# Patient Record
Sex: MALE | Race: WHITE | NOT HISPANIC OR LATINO | URBAN - METROPOLITAN AREA
[De-identification: names, ages, dates, MRNs, and addresses within clinical notes are randomized per-mention and may not be internally consistent; named-entity substitution may affect disease eponyms.]

---

## 2018-01-15 PROBLEM — 12479006 COMPULSIVE BEHAVIOR: Status: ACTIVE | Noted: 2018-01-15

## 2018-01-15 PROBLEM — 134407002 CHRONIC BACK PAIN: Status: ACTIVE | Noted: 2018-01-15

## 2018-01-15 PROBLEM — 271737000 ANEMIA: Status: ACTIVE | Noted: 2018-01-15

## 2018-01-15 PROBLEM — 197480006 ANXIETY DISORDER: Status: ACTIVE | Noted: 2018-01-15

## 2018-01-15 PROBLEM — 38341003 HYPERTENSION: Status: ACTIVE | Noted: 2018-01-15

## 2018-01-15 PROBLEM — 235595009 GASTROESOPHAGEAL REFLUX DISEASE: Status: ACTIVE | Noted: 2018-01-15

## 2020-01-22 PROBLEM — 398050005 DIVERTICULAR DISEASE OF COLON: Status: ACTIVE | Noted: 2020-01-22

## 2021-05-27 ENCOUNTER — OFFICE VISIT (OUTPATIENT)
Dept: URBAN - METROPOLITAN AREA CLINIC 46 | Facility: CLINIC | Age: 41
End: 2021-05-27

## 2021-08-26 ENCOUNTER — OFFICE VISIT (OUTPATIENT)
Dept: URBAN - METROPOLITAN AREA CLINIC 44 | Facility: CLINIC | Age: 41
End: 2021-08-26

## 2021-08-28 ENCOUNTER — TELEPHONE ENCOUNTER (OUTPATIENT)
Dept: URBAN - METROPOLITAN AREA CLINIC 13 | Facility: CLINIC | Age: 41
End: 2021-08-28

## 2021-08-28 RX ORDER — DEXLANSOPRAZOLE 30 MG/1
CAPSULE, DELAYED RELEASE ORAL
OUTPATIENT
Start: 2018-01-16 | End: 2021-08-18

## 2021-08-28 RX ORDER — HYDROCORTISONE 25 MG/G
CREAM TOPICAL
OUTPATIENT
Start: 2018-02-06 | End: 2020-01-22

## 2021-08-28 RX ORDER — RIFAXIMIN 550 MG/1
TABLET ORAL
OUTPATIENT
Start: 2018-02-09 | End: 2020-01-22

## 2021-08-29 ENCOUNTER — TELEPHONE ENCOUNTER (OUTPATIENT)
Dept: URBAN - METROPOLITAN AREA CLINIC 13 | Facility: CLINIC | Age: 41
End: 2021-08-29

## 2021-08-29 RX ORDER — COLESTIPOL HYDROCHLORIDE 1 G/1
TABLET ORAL
Status: ACTIVE | COMMUNITY
Start: 2020-03-12

## 2021-08-29 RX ORDER — DEXLANSOPRAZOLE 60 MG/1
CAPSULE, DELAYED RELEASE ORAL
Status: ACTIVE | COMMUNITY
Start: 2021-05-27

## 2021-08-29 RX ORDER — CLONAZEPAM 0.5 MG/1
TABLET ORAL
Status: ACTIVE | COMMUNITY

## 2021-08-29 RX ORDER — FEXOFENADINE HYDROCHLORIDE 180 MG/1
TABLET, FILM COATED ORAL
Status: ACTIVE | COMMUNITY

## 2021-08-29 RX ORDER — RISPERIDONE 3 MG/1
TABLET ORAL
Status: ACTIVE | COMMUNITY

## 2021-08-29 RX ORDER — FERROUS SULFATE 324(65)MG
TABLET, DELAYED RELEASE (ENTERIC COATED) ORAL
Status: ACTIVE | COMMUNITY

## 2021-08-29 RX ORDER — SERTRALINE HCL 100 MG
TABLET ORAL
Status: ACTIVE | COMMUNITY

## 2021-08-29 RX ORDER — LISINOPRIL 10 MG/1
TABLET ORAL
Status: ACTIVE | COMMUNITY

## 2021-08-29 RX ORDER — OXCARBAZEPINE 150 MG
TABLET ORAL
Status: ACTIVE | COMMUNITY

## 2021-08-29 RX ORDER — PROMETHAZINE HYDROCHLORIDE 25 MG/1
TABLET ORAL
Status: ACTIVE | COMMUNITY

## 2021-09-30 ENCOUNTER — CLAIMS CREATED FROM THE CLAIM WINDOW (OUTPATIENT)
Dept: URBAN - METROPOLITAN AREA CLINIC 4 | Facility: CLINIC | Age: 41
End: 2021-09-30
Payer: MEDICARE

## 2021-09-30 ENCOUNTER — OFFICE VISIT (OUTPATIENT)
Dept: URBAN - METROPOLITAN AREA SURGERY CENTER 27 | Facility: SURGERY CENTER | Age: 41
End: 2021-09-30
Payer: MEDICARE

## 2021-09-30 DIAGNOSIS — K31.89 NONSURGICAL DUMPING SYNDROME: ICD-10-CM

## 2021-09-30 DIAGNOSIS — R13.10 ABNORMAL DEGLUTITION: ICD-10-CM

## 2021-09-30 DIAGNOSIS — K22.2 ACQUIRED ESOPHAGEAL RING: ICD-10-CM

## 2021-09-30 DIAGNOSIS — K21.9 ACID REFLUX: ICD-10-CM

## 2021-09-30 DIAGNOSIS — K29.70 CHRONIC ACITVE GASTRITIS (H.PYLORI NEGATIVE): ICD-10-CM

## 2021-09-30 DIAGNOSIS — R12 BURNING REFLUX: ICD-10-CM

## 2021-09-30 DIAGNOSIS — K31.89 ACQUIRED DEFORMITY OF DUODENUM: ICD-10-CM

## 2021-09-30 DIAGNOSIS — K21.9 GASTRO-ESOPHAGEAL REFLUX DISEASE WITHOUT ESOPHAGITIS: ICD-10-CM

## 2021-09-30 PROCEDURE — 88305 TISSUE EXAM BY PATHOLOGIST: CPT | Performed by: PATHOLOGY

## 2021-09-30 PROCEDURE — G8907 PT DOC NO EVENTS ON DISCHARG: HCPCS | Performed by: INTERNAL MEDICINE

## 2021-09-30 PROCEDURE — 88312 SPECIAL STAINS GROUP 1: CPT | Performed by: PATHOLOGY

## 2021-09-30 PROCEDURE — 43239 EGD BIOPSY SINGLE/MULTIPLE: CPT | Performed by: INTERNAL MEDICINE

## 2021-09-30 PROCEDURE — 43249 ESOPH EGD DILATION <30 MM: CPT | Performed by: INTERNAL MEDICINE

## 2021-09-30 RX ORDER — OXCARBAZEPINE 150 MG
TABLET ORAL
Status: ACTIVE | COMMUNITY

## 2021-09-30 RX ORDER — SERTRALINE HCL 100 MG
TABLET ORAL
Status: ACTIVE | COMMUNITY

## 2021-09-30 RX ORDER — CLONAZEPAM 0.5 MG/1
TABLET ORAL
Status: ACTIVE | COMMUNITY

## 2021-09-30 RX ORDER — PROMETHAZINE HYDROCHLORIDE 25 MG/1
TABLET ORAL
Status: ACTIVE | COMMUNITY

## 2021-09-30 RX ORDER — FERROUS SULFATE 324(65)MG
TABLET, DELAYED RELEASE (ENTERIC COATED) ORAL
Status: ACTIVE | COMMUNITY

## 2021-09-30 RX ORDER — DEXLANSOPRAZOLE 60 MG/1
CAPSULE, DELAYED RELEASE ORAL
Status: ACTIVE | COMMUNITY
Start: 2021-05-27

## 2021-09-30 RX ORDER — RISPERIDONE 3 MG/1
TABLET ORAL
Status: ACTIVE | COMMUNITY

## 2021-09-30 RX ORDER — FEXOFENADINE HYDROCHLORIDE 180 MG/1
TABLET, FILM COATED ORAL
Status: ACTIVE | COMMUNITY

## 2021-09-30 RX ORDER — LISINOPRIL 10 MG/1
TABLET ORAL
Status: ACTIVE | COMMUNITY

## 2021-09-30 RX ORDER — COLESTIPOL HYDROCHLORIDE 1 G/1
TABLET ORAL
Status: ACTIVE | COMMUNITY
Start: 2020-03-12

## 2021-12-17 ENCOUNTER — TELEPHONE ENCOUNTER (OUTPATIENT)
Dept: URBAN - METROPOLITAN AREA CLINIC 46 | Facility: CLINIC | Age: 41
End: 2021-12-17

## 2021-12-17 RX ORDER — COLESTIPOL HYDROCHLORIDE 1 G/1
1 TABLET TABLET ORAL
Qty: 60 | Refills: 3
Start: 2020-03-12

## 2022-03-23 ENCOUNTER — TELEPHONE ENCOUNTER (OUTPATIENT)
Dept: URBAN - METROPOLITAN AREA CLINIC 46 | Facility: CLINIC | Age: 42
End: 2022-03-23

## 2022-03-23 RX ORDER — COLESTIPOL HYDROCHLORIDE 1 G/1
1 TABLET TABLET ORAL
Qty: 60 | Refills: 3
Start: 2020-03-12

## 2022-03-30 ENCOUNTER — TELEPHONE ENCOUNTER (OUTPATIENT)
Dept: URBAN - METROPOLITAN AREA CLINIC 46 | Facility: CLINIC | Age: 42
End: 2022-03-30

## 2022-07-18 ENCOUNTER — TELEPHONE ENCOUNTER (OUTPATIENT)
Dept: URBAN - METROPOLITAN AREA CLINIC 44 | Facility: CLINIC | Age: 42
End: 2022-07-18

## 2022-07-18 RX ORDER — COLESTIPOL HYDROCHLORIDE 1 G/1
1 TABLET TABLET ORAL
Qty: 60 | Refills: 3
Start: 2020-03-12

## 2022-07-28 ENCOUNTER — WEB ENCOUNTER (OUTPATIENT)
Dept: URBAN - METROPOLITAN AREA CLINIC 44 | Facility: CLINIC | Age: 42
End: 2022-07-28

## 2022-07-28 RX ORDER — DEXLANSOPRAZOLE 60 MG/1
1 CAPSULE CAPSULE, DELAYED RELEASE ORAL ONCE A DAY
Qty: 30 CAPSULE | Refills: 3
Start: 2021-05-27

## 2022-07-28 RX ORDER — DEXLANSOPRAZOLE 60 MG/1
CAPSULE, DELAYED RELEASE ORAL
Start: 2021-05-27

## 2022-11-14 ENCOUNTER — TELEPHONE ENCOUNTER (OUTPATIENT)
Dept: URBAN - METROPOLITAN AREA CLINIC 46 | Facility: CLINIC | Age: 42
End: 2022-11-14

## 2022-11-14 RX ORDER — COLESTIPOL HYDROCHLORIDE 1 G/1
1 TABLET TABLET ORAL
Qty: 60 | Refills: 4

## 2022-12-05 ENCOUNTER — TELEPHONE ENCOUNTER (OUTPATIENT)
Dept: URBAN - METROPOLITAN AREA CLINIC 44 | Facility: CLINIC | Age: 42
End: 2022-12-05

## 2022-12-05 RX ORDER — DEXLANSOPRAZOLE 60 MG/1
1 CAPSULE CAPSULE, DELAYED RELEASE ORAL ONCE A DAY
Qty: 30 CAPSULE | Refills: 3
Start: 2021-05-27

## 2023-01-27 ENCOUNTER — ERX REFILL RESPONSE (OUTPATIENT)
Dept: URBAN - METROPOLITAN AREA CLINIC 44 | Facility: CLINIC | Age: 43
End: 2023-01-27

## 2023-01-27 RX ORDER — DEXLANSOPRAZOLE 60 MG/1
1 CAPSULE CAPSULE, DELAYED RELEASE ORAL ONCE A DAY
Qty: 30 CAPSULE | Refills: 3 | OUTPATIENT

## 2023-01-27 RX ORDER — DEXLANSOPRAZOLE 60 MG/1
TAKE ONE CAPSULE BY MOUTH ONE TIME DAILY CAPSULE, DELAYED RELEASE ORAL
Qty: 30 CAPSULE | Refills: 3 | OUTPATIENT

## 2023-03-01 ENCOUNTER — OFFICE VISIT (OUTPATIENT)
Dept: URBAN - METROPOLITAN AREA CLINIC 44 | Facility: CLINIC | Age: 43
End: 2023-03-01
Payer: MEDICARE

## 2023-03-01 VITALS
WEIGHT: 241 LBS | HEIGHT: 66 IN | BODY MASS INDEX: 38.73 KG/M2 | SYSTOLIC BLOOD PRESSURE: 128 MMHG | DIASTOLIC BLOOD PRESSURE: 82 MMHG | OXYGEN SATURATION: 98 % | HEART RATE: 90 BPM | TEMPERATURE: 97.9 F

## 2023-03-01 DIAGNOSIS — R10.11 ABDOMINAL BURNING SENSATION IN RIGHT UPPER QUADRANT: ICD-10-CM

## 2023-03-01 DIAGNOSIS — R19.8 BORBORYGMUS: ICD-10-CM

## 2023-03-01 DIAGNOSIS — R10.12 ABDOMINAL BURNING SENSATION IN LEFT UPPER QUADRANT: ICD-10-CM

## 2023-03-01 PROBLEM — 235595009: Status: ACTIVE | Noted: 2023-03-01

## 2023-03-01 PROCEDURE — 99214 OFFICE O/P EST MOD 30 MIN: CPT | Performed by: PHYSICIAN ASSISTANT

## 2023-03-01 RX ORDER — DEXLANSOPRAZOLE 60 MG/1
TAKE ONE CAPSULE BY MOUTH ONE TIME DAILY CAPSULE, DELAYED RELEASE ORAL ONCE A DAY
OUTPATIENT

## 2023-03-01 RX ORDER — L.ACID,FERM,PLA,RHA/B.BIF,LONG 126 MG
AS DIRECTED TABLET, DELAYED AND EXTENDED RELEASE ORAL DAILY
Status: ACTIVE | COMMUNITY

## 2023-03-01 RX ORDER — SUCRALFATE 1 G/10ML
10 ML ON AN EMPTY STOMACH SUSPENSION ORAL TWICE A DAY
Qty: 600 ML | Refills: 1 | OUTPATIENT
Start: 2023-03-01 | End: 2023-04-30

## 2023-03-01 RX ORDER — LISINOPRIL 10 MG/1
1 TABLET TABLET ORAL ONCE A DAY
Status: ACTIVE | COMMUNITY

## 2023-03-01 RX ORDER — SERTRALINE HCL 100 MG
1 TABLET TABLET ORAL ONCE A DAY
Status: ACTIVE | COMMUNITY

## 2023-03-01 RX ORDER — RISPERIDONE 3 MG/1
1 TABLET TABLET ORAL ONCE A DAY
Status: ACTIVE | COMMUNITY

## 2023-03-01 RX ORDER — FEXOFENADINE HYDROCHLORIDE 180 MG/1
1 TABLET TABLET, FILM COATED ORAL ONCE A DAY
Status: ACTIVE | COMMUNITY

## 2023-03-01 RX ORDER — OXCARBAZEPINE 150 MG
1 TABLET TABLET ORAL TWICE A DAY
Status: ACTIVE | COMMUNITY

## 2023-03-01 RX ORDER — MIRABEGRON 25 MG/1
1 TABLET TABLET, FILM COATED, EXTENDED RELEASE ORAL ONCE A DAY
Status: ACTIVE | COMMUNITY

## 2023-03-01 RX ORDER — DEXLANSOPRAZOLE 60 MG/1
TAKE ONE CAPSULE BY MOUTH ONE TIME DAILY CAPSULE, DELAYED RELEASE ORAL ONCE A DAY
Refills: 3 | Status: ACTIVE | COMMUNITY

## 2023-03-01 RX ORDER — BUSPIRONE HYDROCHLORIDE 10 MG/1
1 TABLET TABLET ORAL TWICE A DAY
Status: ACTIVE | COMMUNITY

## 2023-03-01 RX ORDER — FERROUS SULFATE 325(65) MG
1 TABLET TABLET ORAL ONCE A DAY
Status: ACTIVE | COMMUNITY

## 2023-03-01 RX ORDER — COLESTIPOL HYDROCHLORIDE 1 G/1
1 TABLET TABLET ORAL
Qty: 60 | Refills: 4 | Status: ACTIVE | COMMUNITY

## 2023-03-01 NOTE — PHYSICAL EXAM NEUROLOGIC:
oriented to person, place and time , normal sensation , short and long term memory intact. 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

## 2023-03-01 NOTE — PHYSICAL EXAM GASTROINTESTINAL
Abdomen , soft, nontender, obese , no guarding or rigidity , no masses palpable , normal bowel sounds , Liver and Spleen , no hepatomegaly present , no hepatosplenomegaly , liver nontender , spleen not palpable

## 2023-03-01 NOTE — HPI-TODAY'S VISIT:
43 y/o male with h/o reflux and diarrhea suspected to be post-cholecystectomy related. When last seen in 8/2021, he had c/o break through reflux and dyspepsia symptoms as well as occasional dysphagia; at that time was on Dexilant 60 mg daily. An EGD was done 9/2021 revealing mucosal changes concerning for possibe EoE, benign esophageal stenosis dilated to 18 mm with balloon, gastritis, and small bowel appearing concerning for possible Celiac; esophageal Bx was negative for EoE or Kenney's, gastric Bx negative for H. pylori, and SB Bx unremarkable.   He had a lap karen in 2020 with subsequent diarrheal stool which resolved with Colestipol BID. He has h/o anal fissure treated with Diltiazem cream. Colonoscopy 2/2018 revealed mild diverticulosis and grade II internal hemorrhoids.  He returns today. Labs 8/2022 showed a normal CBC, hepatic, and renal function. He is accompanied by his mother. He had back surgery in December, and since that time has had increase in GI distress. He reports having  break through reflux a couple of times per week, and is still on Dexilant daily; denies dysphagia. He has been taking NSAID medications daily since his back surgery (Advil and Ibuprofen). He c/o abdominal pain described as burning and cramping with associated nausea at times, as well as borborygmous. His bowels have been alternating between constipation, loose stool, and normal stool. He is still taking Colestipol BID. He denies any blood in the stool that he can see. Appetite is good and he has gained weight, which he and his mother think may be related to medication side effects.

## 2023-03-06 ENCOUNTER — TELEPHONE ENCOUNTER (OUTPATIENT)
Dept: URBAN - METROPOLITAN AREA CLINIC 44 | Facility: CLINIC | Age: 43
End: 2023-03-06

## 2023-03-06 RX ORDER — SUCRALFATE 1 G/1
1 TABLET ON AN EMPTY STOMACH TABLET ORAL TWICE A DAY
Qty: 60 TABLET | Refills: 1

## 2023-04-10 ENCOUNTER — OFFICE VISIT (OUTPATIENT)
Dept: URBAN - METROPOLITAN AREA CLINIC 48 | Facility: CLINIC | Age: 43
End: 2023-04-10
Payer: MEDICARE

## 2023-04-10 ENCOUNTER — DASHBOARD ENCOUNTERS (OUTPATIENT)
Age: 43
End: 2023-04-10

## 2023-04-10 VITALS
WEIGHT: 242 LBS | HEIGHT: 66 IN | TEMPERATURE: 97.5 F | DIASTOLIC BLOOD PRESSURE: 85 MMHG | SYSTOLIC BLOOD PRESSURE: 138 MMHG | BODY MASS INDEX: 38.89 KG/M2 | HEART RATE: 92 BPM

## 2023-04-10 DIAGNOSIS — K21.9 GASTROESOPHAGEAL REFLUX DISEASE, UNSPECIFIED WHETHER ESOPHAGITIS PRESENT: ICD-10-CM

## 2023-04-10 DIAGNOSIS — R19.8 ALTERED BOWEL FUNCTION: ICD-10-CM

## 2023-04-10 PROCEDURE — 99213 OFFICE O/P EST LOW 20 MIN: CPT | Performed by: PHYSICIAN ASSISTANT

## 2023-04-10 RX ORDER — OXCARBAZEPINE 150 MG
1 TABLET TABLET ORAL TWICE A DAY
Status: ACTIVE | COMMUNITY

## 2023-04-10 RX ORDER — BUSPIRONE HYDROCHLORIDE 10 MG/1
1 TABLET TABLET ORAL TWICE A DAY
Status: ACTIVE | COMMUNITY

## 2023-04-10 RX ORDER — DEXLANSOPRAZOLE 60 MG/1
TAKE ONE CAPSULE BY MOUTH ONE TIME DAILY CAPSULE, DELAYED RELEASE ORAL ONCE A DAY
OUTPATIENT

## 2023-04-10 RX ORDER — SERTRALINE HCL 100 MG
2 TABLETS TABLET ORAL ONCE A DAY
Status: ACTIVE | COMMUNITY

## 2023-04-10 RX ORDER — FEXOFENADINE HYDROCHLORIDE 180 MG/1
1 TABLET TABLET, FILM COATED ORAL ONCE A DAY
Status: ACTIVE | COMMUNITY

## 2023-04-10 RX ORDER — MIRABEGRON 25 MG/1
1 TABLET TABLET, FILM COATED, EXTENDED RELEASE ORAL ONCE A DAY
Status: ACTIVE | COMMUNITY

## 2023-04-10 RX ORDER — DEXLANSOPRAZOLE 60 MG/1
TAKE ONE CAPSULE BY MOUTH ONE TIME DAILY CAPSULE, DELAYED RELEASE ORAL ONCE A DAY
Status: ACTIVE | COMMUNITY

## 2023-04-10 RX ORDER — RISPERIDONE 3 MG/1
2 TABLETS TABLET ORAL ONCE A DAY
Status: ACTIVE | COMMUNITY

## 2023-04-10 RX ORDER — L.ACID,FERM,PLA,RHA/B.BIF,LONG 126 MG
AS DIRECTED TABLET, DELAYED AND EXTENDED RELEASE ORAL DAILY
Status: ACTIVE | COMMUNITY

## 2023-04-10 RX ORDER — FERROUS SULFATE 325(65) MG
2 TABLET TABLET ORAL ONCE A DAY
Status: ACTIVE | COMMUNITY

## 2023-04-10 RX ORDER — LISINOPRIL 10 MG/1
1 TABLET TABLET ORAL ONCE A DAY
Status: ACTIVE | COMMUNITY

## 2023-04-10 RX ORDER — COLESTIPOL HYDROCHLORIDE 1 G/1
1 TABLET TABLET ORAL
Qty: 60 | Refills: 4 | Status: ACTIVE | COMMUNITY

## 2023-04-10 RX ORDER — SUCRALFATE 1 G/1
1 TABLET ON AN EMPTY STOMACH TABLET ORAL TWICE A DAY
Qty: 60 TABLET | Refills: 1 | Status: ON HOLD | COMMUNITY

## 2023-04-10 NOTE — HPI-TODAY'S VISIT:
41 y/o male with h/o reflux and diarrhea suspected to be post-cholecystectomy related. Seen in 8/2021, he had c/o break through reflux and dyspepsia symptoms as well as occasional dysphagia; at that time was on Dexilant 60 mg daily. An EGD was done 9/2021 revealing mucosal changes concerning for possibe EoE, benign esophageal stenosis dilated to 18 mm with balloon, gastritis, and small bowel appearing concerning for possible Celiac; esophageal Bx was negative for EoE or Kenney's, gastric Bx negative for H. pylori, and SB Bx unremarkable.   He had a lap karen in 2020 with subsequent diarrheal stool which resolved with Colestipol BID. He has h/o anal fissure treated with Diltiazem cream. Colonoscopy 2/2018 revealed mild diverticulosis and grade II internal hemorrhoids.  Last seen 3/1/23. Labs 8/2022 showed a normal CBC, hepatic, and renal function. He was accompanied by his mother. He had back surgery in December, and since that time has had increase in GI distress. He reported having  break through reflux a couple of times per week, and is still on Dexilant daily; denied dysphagia. He had been taking NSAID medications daily since his back surgery (Advil and Ibuprofen). He c/o abdominal pain described as burning and cramping with associated nausea at times, as well as borborygmous. His bowels were alternating between constipation, loose stool, and normal stool. He was still taking Colestipol BID. He denied any blood in the stool that he can see. Appetite is good and he has gained weight, which he and his mother think may be related to medication side effects. He was advised to continue Dexilant daily and was also given a course of Sucralfate; advised to avoid NSAIDs and take Tylenol PRN for pain. Also advised to hold Colestipol on days he felt constipated.   Returns today and dyspepsia has significantly improved since discontinuing NSAIDs. He did have some increased nausea and constipation with the Sucralfate, but took it for 2 weeks. Still on daily Dexilant. He continues to have altered bowels between constipation and diarrhea; on Colestipol BID. No other new concerns.

## 2023-05-22 ENCOUNTER — TELEPHONE ENCOUNTER (OUTPATIENT)
Dept: URBAN - METROPOLITAN AREA CLINIC 46 | Facility: CLINIC | Age: 43
End: 2023-05-22

## 2023-05-22 RX ORDER — COLESTIPOL HYDROCHLORIDE 1 G/1
1 TABLET TABLET ORAL
Qty: 60 | Refills: 4

## 2023-09-01 ENCOUNTER — TELEPHONE ENCOUNTER (OUTPATIENT)
Dept: URBAN - METROPOLITAN AREA CLINIC 44 | Facility: CLINIC | Age: 43
End: 2023-09-01

## 2023-09-01 RX ORDER — PANTOPRAZOLE SODIUM 40 MG/1
1 TABLET TABLET, DELAYED RELEASE ORAL ONCE A DAY
Qty: 90 TABLET | Refills: 3 | OUTPATIENT
Start: 2023-09-05

## 2023-09-21 ENCOUNTER — TELEPHONE ENCOUNTER (OUTPATIENT)
Dept: URBAN - METROPOLITAN AREA CLINIC 48 | Facility: CLINIC | Age: 43
End: 2023-09-21

## 2023-09-29 ENCOUNTER — TELEPHONE ENCOUNTER (OUTPATIENT)
Dept: URBAN - METROPOLITAN AREA CLINIC 48 | Facility: CLINIC | Age: 43
End: 2023-09-29

## 2024-06-20 ENCOUNTER — OFFICE VISIT (OUTPATIENT)
Dept: URBAN - METROPOLITAN AREA CLINIC 48 | Facility: CLINIC | Age: 44
End: 2024-06-20
Payer: COMMERCIAL

## 2024-06-20 VITALS
TEMPERATURE: 97.5 F | BODY MASS INDEX: 39.53 KG/M2 | SYSTOLIC BLOOD PRESSURE: 135 MMHG | HEIGHT: 66 IN | DIASTOLIC BLOOD PRESSURE: 84 MMHG | WEIGHT: 246 LBS | HEART RATE: 101 BPM

## 2024-06-20 DIAGNOSIS — K21.9 GASTROESOPHAGEAL REFLUX DISEASE, UNSPECIFIED WHETHER ESOPHAGITIS PRESENT: ICD-10-CM

## 2024-06-20 DIAGNOSIS — R19.8 ALTERED BOWEL FUNCTION: ICD-10-CM

## 2024-06-20 PROBLEM — 88111009: Status: ACTIVE | Noted: 2024-06-20

## 2024-06-20 PROCEDURE — 99213 OFFICE O/P EST LOW 20 MIN: CPT | Performed by: INTERNAL MEDICINE

## 2024-06-20 RX ORDER — RISPERIDONE 3 MG/1
2 TABLETS TABLET ORAL ONCE A DAY
Status: ACTIVE | COMMUNITY

## 2024-06-20 RX ORDER — OXCARBAZEPINE 150 MG
1 TABLET TABLET ORAL TWICE A DAY
Status: ACTIVE | COMMUNITY

## 2024-06-20 RX ORDER — FEXOFENADINE HYDROCHLORIDE 180 MG/1
1 TABLET TABLET, FILM COATED ORAL ONCE A DAY
Status: ACTIVE | COMMUNITY

## 2024-06-20 RX ORDER — FERROUS SULFATE 325(65) MG
2 TABLET TABLET ORAL ONCE A DAY
Status: ACTIVE | COMMUNITY

## 2024-06-20 RX ORDER — SERTRALINE HCL 100 MG
2 TABLETS TABLET ORAL ONCE A DAY
Status: ACTIVE | COMMUNITY

## 2024-06-20 RX ORDER — L.ACID,FERM,PLA,RHA/B.BIF,LONG 126 MG
AS DIRECTED TABLET, DELAYED AND EXTENDED RELEASE ORAL DAILY
Status: ACTIVE | COMMUNITY

## 2024-06-20 RX ORDER — DEXLANSOPRAZOLE 60 MG/1
1 CAPSULE CAPSULE, DELAYED RELEASE PELLETS ORAL ONCE A DAY
Qty: 30 | Refills: 5 | OUTPATIENT
Start: 2024-06-20

## 2024-06-20 RX ORDER — ZINC GLUCONATE 50 MG
1 TABLET TABLET ORAL ONCE A DAY
Status: ACTIVE | COMMUNITY

## 2024-06-20 RX ORDER — FAMOTIDINE 40 MG/1
1 TABLET AS NEEDED FOR BREAKTHROUGH REFLUX TABLET, FILM COATED ORAL ONCE A DAY
Qty: 30 TABLET | Refills: 3 | OUTPATIENT
Start: 2024-06-20

## 2024-06-20 RX ORDER — COLESTIPOL HYDROCHLORIDE 1 G/1
1 TABLET TABLET ORAL
Qty: 60 | Refills: 3 | Status: ACTIVE | COMMUNITY

## 2024-06-20 RX ORDER — DEXLANSOPRAZOLE 60 MG/1
TAKE ONE CAPSULE BY MOUTH ONE TIME DAILY CAPSULE, DELAYED RELEASE ORAL ONCE A DAY
Status: ACTIVE | COMMUNITY

## 2024-06-20 RX ORDER — COLESTIPOL HYDROCHLORIDE 1 G/1
TAKE 2 TABLETS 2 HOURS AWAY FROM OTHER MEDICATIONS TABLET, FILM COATED ORAL ONCE A DAY
Qty: 60 | Refills: 5 | OUTPATIENT
Start: 2024-06-20

## 2024-06-20 RX ORDER — LISINOPRIL 10 MG/1
1 TABLET TABLET ORAL ONCE A DAY
Status: ACTIVE | COMMUNITY

## 2024-06-20 RX ORDER — MIRABEGRON 25 MG/1
1 TABLET TABLET, FILM COATED, EXTENDED RELEASE ORAL ONCE A DAY
Status: ACTIVE | COMMUNITY

## 2024-06-20 NOTE — HPI-TODAY'S VISIT:
42 y/o male with h/o reflux and diarrhea suspected to be post-cholecystectomy related. Seen in 8/2021, he had c/o break through reflux and dyspepsia symptoms as well as occasional dysphagia; at that time was on Dexilant 60 mg daily. An EGD was done 9/2021 revealing mucosal changes concerning for possibe EoE, benign esophageal stenosis dilated to 18 mm with balloon, gastritis, and small bowel appearing concerning for possible Celiac; esophageal Bx was negative for EoE or Kenney's, gastric Bx negative for H. pylori, and SB Bx unremarkable.   He had a lap karen in 2020 with subsequent diarrheal stool which resolved with Colestipol BID. He has h/o anal fissure treated with Diltiazem cream. Colonoscopy 2/2018 revealed mild diverticulosis and grade II internal hemorrhoids.  Last seen for GERD and altered bowel function. GERD is mostly controlled on Dexlansoprazole 60mg daily but has occasional breakthrough reflux. Bowel movements are daily of formed stool. Loose stools are managed on 2 Colestipol. 9/2023 CBC and CMP are normal. Overall, he is doing well and working 2 days a week.

## 2024-10-14 ENCOUNTER — ERX REFILL RESPONSE (OUTPATIENT)
Dept: URBAN - METROPOLITAN AREA CLINIC 44 | Facility: CLINIC | Age: 44
End: 2024-10-14

## 2024-10-14 RX ORDER — FAMOTIDINE 40 MG/1
1 TABLET AS NEEDED FOR BREAKTHROUGH REFLUX TABLET, FILM COATED ORAL ONCE A DAY
Qty: 30 TABLET | Refills: 3 | OUTPATIENT

## 2024-11-07 ENCOUNTER — OFFICE VISIT (OUTPATIENT)
Dept: URBAN - METROPOLITAN AREA CLINIC 48 | Facility: CLINIC | Age: 44
End: 2024-11-07
Payer: COMMERCIAL

## 2024-11-07 VITALS
WEIGHT: 240 LBS | HEART RATE: 86 BPM | DIASTOLIC BLOOD PRESSURE: 83 MMHG | TEMPERATURE: 98.1 F | HEIGHT: 66 IN | SYSTOLIC BLOOD PRESSURE: 130 MMHG | BODY MASS INDEX: 38.57 KG/M2

## 2024-11-07 DIAGNOSIS — R19.4 ALTERATION IN BOWEL ELIMINATION: ICD-10-CM

## 2024-11-07 DIAGNOSIS — K21.9 GASTROESOPHAGEAL REFLUX DISEASE, UNSPECIFIED WHETHER ESOPHAGITIS PRESENT: ICD-10-CM

## 2024-11-07 PROCEDURE — 99213 OFFICE O/P EST LOW 20 MIN: CPT | Performed by: INTERNAL MEDICINE

## 2024-11-07 RX ORDER — ZINC GLUCONATE 50 MG
1 TABLET TABLET ORAL ONCE A DAY
Status: ACTIVE | COMMUNITY

## 2024-11-07 RX ORDER — LISINOPRIL 10 MG/1
1 TABLET TABLET ORAL ONCE A DAY
Status: ACTIVE | COMMUNITY

## 2024-11-07 RX ORDER — COLESTIPOL HYDROCHLORIDE 1 G/1
TAKE 2 TABLETS 2 HOURS AWAY FROM OTHER MEDICATIONS TABLET, FILM COATED ORAL ONCE A DAY
Qty: 60 | Refills: 5 | Status: ACTIVE | COMMUNITY
Start: 2024-06-20

## 2024-11-07 RX ORDER — OXCARBAZEPINE 150 MG
1 TABLET TABLET ORAL TWICE A DAY
Status: ACTIVE | COMMUNITY

## 2024-11-07 RX ORDER — FEXOFENADINE HYDROCHLORIDE 180 MG/1
1 TABLET TABLET, FILM COATED ORAL ONCE A DAY
Status: ACTIVE | COMMUNITY

## 2024-11-07 RX ORDER — SERTRALINE HCL 100 MG
2 TABLETS TABLET ORAL ONCE A DAY
Status: ACTIVE | COMMUNITY

## 2024-11-07 RX ORDER — DEXLANSOPRAZOLE 60 MG/1
1 CAPSULE CAPSULE, DELAYED RELEASE PELLETS ORAL ONCE A DAY
Qty: 30 | Refills: 5 | Status: ACTIVE | COMMUNITY
Start: 2024-06-20

## 2024-11-07 RX ORDER — FERROUS SULFATE 325(65) MG
2 TABLET TABLET ORAL ONCE A DAY
Status: ACTIVE | COMMUNITY

## 2024-11-07 RX ORDER — FAMOTIDINE 40 MG/1
1 TABLET AS NEEDED FOR BREAKTHROUGH REFLUX TABLET, FILM COATED ORAL ONCE A DAY
Qty: 30 TABLET | Refills: 3 | Status: ACTIVE | COMMUNITY

## 2024-11-07 RX ORDER — COLESTIPOL HYDROCHLORIDE 1 G/1
TAKE 1-2 TABLETS 2 HOURS AWAY FROM OTHER MEDICATION TABLET, FILM COATED ORAL ONCE A DAY
Qty: 180 | Refills: 3 | OUTPATIENT
Start: 2024-11-07

## 2024-11-07 RX ORDER — DEXLANSOPRAZOLE 60 MG/1
1 CAPSULE 1/2 TO 1 HOUR BEFORE MORNING MEAL CAPSULE, DELAYED RELEASE PELLETS ORAL ONCE A DAY
Qty: 90 | Refills: 3 | OUTPATIENT
Start: 2024-11-07

## 2024-11-07 RX ORDER — RISPERIDONE 3 MG/1
2 TABLETS TABLET ORAL ONCE A DAY
Status: ACTIVE | COMMUNITY

## 2024-11-07 RX ORDER — MIRABEGRON 25 MG/1
1 TABLET TABLET, FILM COATED, EXTENDED RELEASE ORAL ONCE A DAY
Status: ACTIVE | COMMUNITY

## 2024-11-07 RX ORDER — L.ACID,FERM,PLA,RHA/B.BIF,LONG 126 MG
AS DIRECTED TABLET, DELAYED AND EXTENDED RELEASE ORAL DAILY
Status: ACTIVE | COMMUNITY

## 2024-11-07 NOTE — HPI-TODAY'S VISIT:
42 y/o male with h/o reflux and diarrhea suspected to be post-cholecystectomy related presents for follow up. Seen in 8/2021, he had c/o break through reflux and dyspepsia symptoms as well as occasional dysphagia; at that time was on Dexilant 60 mg daily. Denies GERD but reports occasional dysphagia to pills. Occasionally, he takes Famotidine. An EGD was done 9/2021 revealing mucosal changes concerning for possible EoE, benign esophageal stenosis dilated to 18 mm with balloon, gastritis, and small bowel appearing concerning for possible Celiac; esophageal Bx was negative for EoE or Kenney's, gastric Bx negative for H. pylori, and SB Bx unremarkable.   He had a lap karen in 2020 with subsequent diarrheal stool which resolved with Colestipol BID. He has h/o anal fissure treated with Diltiazem cream. Colonoscopy 2/2018 revealed mild diverticulosis and grade II internal hemorrhoids.
present